# Patient Record
Sex: FEMALE | Race: WHITE | URBAN - METROPOLITAN AREA
[De-identification: names, ages, dates, MRNs, and addresses within clinical notes are randomized per-mention and may not be internally consistent; named-entity substitution may affect disease eponyms.]

---

## 2022-08-16 ENCOUNTER — OFFICE VISIT (OUTPATIENT)
Dept: URBAN - METROPOLITAN AREA CLINIC 105 | Facility: CLINIC | Age: 67
End: 2022-08-16
Payer: MEDICARE

## 2022-08-16 ENCOUNTER — WEB ENCOUNTER (OUTPATIENT)
Dept: URBAN - METROPOLITAN AREA CLINIC 105 | Facility: CLINIC | Age: 67
End: 2022-08-16

## 2022-08-16 ENCOUNTER — LAB OUTSIDE AN ENCOUNTER (OUTPATIENT)
Dept: URBAN - METROPOLITAN AREA CLINIC 105 | Facility: CLINIC | Age: 67
End: 2022-08-16

## 2022-08-16 VITALS
DIASTOLIC BLOOD PRESSURE: 54 MMHG | TEMPERATURE: 97.1 F | HEART RATE: 106 BPM | WEIGHT: 230 LBS | HEIGHT: 62 IN | BODY MASS INDEX: 42.33 KG/M2 | SYSTOLIC BLOOD PRESSURE: 117 MMHG

## 2022-08-16 DIAGNOSIS — R11.0 NAUSEA: ICD-10-CM

## 2022-08-16 DIAGNOSIS — D50.0 IRON DEFICIENCY ANEMIA DUE TO CHRONIC BLOOD LOSS: ICD-10-CM

## 2022-08-16 PROCEDURE — 99204 OFFICE O/P NEW MOD 45 MIN: CPT | Performed by: INTERNAL MEDICINE

## 2022-08-16 RX ORDER — PIOGLITAZONE 15 MG/1
1 TABLET TABLET ORAL ONCE A DAY
Qty: 30 | Status: ACTIVE | COMMUNITY
Start: 2022-08-16

## 2022-08-16 RX ORDER — METFORMIN ER 500 MG 500 MG/1
TAKE 2 TABLETS BY MOUTH EVERY MORNING AND 1 TABLET EVERY EVENING TABLET ORAL
Qty: 270 EACH | Refills: 3 | Status: ACTIVE | COMMUNITY

## 2022-08-16 RX ORDER — MULTIVITAMIN
1 TABLET TABLET ORAL ONCE A DAY
Qty: 30 | Status: ACTIVE | COMMUNITY
Start: 2022-08-16

## 2022-08-16 RX ORDER — LAMOTRIGINE 100 MG/1
1 TABLET TABLET ORAL ONCE A DAY
Qty: 30 | Status: ACTIVE | COMMUNITY
Start: 2022-08-16

## 2022-08-16 RX ORDER — LISINOPRIL 5 MG/1
1 TABLET TABLET ORAL ONCE A DAY
Qty: 30 | Status: ACTIVE | COMMUNITY
Start: 2022-08-16

## 2022-08-16 RX ORDER — INSULIN GLARGINE 100 [IU]/ML
INJECT 68 UNITS UNDER THE SKIN DAILY INJECTION, SOLUTION SUBCUTANEOUS
Qty: 15 MILLILITER | Refills: 8 | Status: ACTIVE | COMMUNITY

## 2022-08-16 RX ORDER — BLOOD SUGAR DIAGNOSTIC
1 STRIP BY FINGER STICK ROUTE 4 (FOUR) TIMES DAILY. ONE TOUCH ULTRA E11.9 STRIP MISCELLANEOUS
Qty: 100 EACH | Refills: 1 | Status: ACTIVE | COMMUNITY

## 2022-08-16 RX ORDER — NYSTATIN 100000 [USP'U]/G
CREAM TOPICAL
Qty: 30 GRAM | Status: ACTIVE | COMMUNITY

## 2022-08-16 RX ORDER — DABIGATRAN ETEXILATE MESYLATE 150 MG/1
TAKE 1 CAPSULE BY MOUTH TWICE A DAY CAPSULE ORAL
Qty: 180 EACH | Refills: 0 | Status: ACTIVE | COMMUNITY

## 2022-08-16 RX ORDER — ATORVASTATIN CALCIUM 40 MG/1
1 TABLET TABLET, FILM COATED ORAL ONCE A DAY
Qty: 30 | Status: ACTIVE | COMMUNITY
Start: 2022-08-16

## 2022-08-16 NOTE — HPI-TODAY'S VISIT:
The patient presents for iron deficiency anemia.  Today, the patient says she has been on oral iron 65 mg 1 pill QD (no associated constipation) for a month. She denies melena before starting on iron. She denies rectal bleeding. She had her last colon (first one) at age 62 which was normal she states. She has never had an EGD. She started taking ibuprofen after her surgery, taking 800 mg 1 pill QD until she d/c in July. Prior to her surgery, she was not taking ibuprofen. She follows with Dr. Lira since having a blood clot in 2018. She was previously taking Warfarin, but has been switched to Pradaxa. She last saw Dr. Lira in June. She reports having nausea/couple days that she takes Pepto-Bismol for.  Labs 6/11/22 - CBC normal except hgb 10.1

## 2022-08-17 LAB
ABSOLUTE BASOPHILS: 55
ABSOLUTE EOSINOPHILS: 164
ABSOLUTE LYMPHOCYTES: 1766
ABSOLUTE MONOCYTES: 556
ABSOLUTE NEUTROPHILS: 8360
BASOPHILS: 0.5
EOSINOPHILS: 1.5
FERRITIN, SERUM: 24
HEMATOCRIT: 32.9
HEMOGLOBIN: 10.8
IRON BIND.CAP.(TIBC): 381
IRON SATURATION: 15
IRON: 59
LYMPHOCYTES: 16.2
MCH: 29.2
MCHC: 32.8
MCV: 88.9
MONOCYTES: 5.1
MPV: 10.6
NEUTROPHILS: 76.7
PLATELET COUNT: 296
RDW: 13.2
RED BLOOD CELL COUNT: 3.7
WHITE BLOOD CELL COUNT: 10.9

## 2022-10-18 ENCOUNTER — TELEPHONE ENCOUNTER (OUTPATIENT)
Dept: URBAN - METROPOLITAN AREA CLINIC 98 | Facility: CLINIC | Age: 67
End: 2022-10-18

## 2022-10-21 ENCOUNTER — OFFICE VISIT (OUTPATIENT)
Dept: URBAN - METROPOLITAN AREA MEDICAL CENTER 33 | Facility: MEDICAL CENTER | Age: 67
End: 2022-10-21

## 2022-12-06 ENCOUNTER — TELEPHONE ENCOUNTER (OUTPATIENT)
Dept: URBAN - METROPOLITAN AREA CLINIC 92 | Facility: CLINIC | Age: 67
End: 2022-12-06

## 2022-12-06 ENCOUNTER — OFFICE VISIT (OUTPATIENT)
Dept: URBAN - METROPOLITAN AREA MEDICAL CENTER 33 | Facility: MEDICAL CENTER | Age: 67
End: 2022-12-06
Payer: MEDICARE

## 2022-12-06 DIAGNOSIS — D12.2 ADENOMA OF ASCENDING COLON: ICD-10-CM

## 2022-12-06 DIAGNOSIS — D50.9 ANEMIA: ICD-10-CM

## 2022-12-06 DIAGNOSIS — D12.8 ADENOMATOUS POLYP OF RECTUM: ICD-10-CM

## 2022-12-06 PROCEDURE — 45385 COLONOSCOPY W/LESION REMOVAL: CPT | Performed by: INTERNAL MEDICINE

## 2022-12-06 PROCEDURE — 45380 COLONOSCOPY AND BIOPSY: CPT | Performed by: INTERNAL MEDICINE

## 2022-12-06 PROCEDURE — 43239 EGD BIOPSY SINGLE/MULTIPLE: CPT | Performed by: INTERNAL MEDICINE

## 2022-12-06 RX ORDER — PIOGLITAZONE 15 MG/1
1 TABLET TABLET ORAL ONCE A DAY
Qty: 30 | Status: ACTIVE | COMMUNITY
Start: 2022-08-16

## 2022-12-06 RX ORDER — LISINOPRIL 5 MG/1
1 TABLET TABLET ORAL ONCE A DAY
Qty: 30 | Status: ACTIVE | COMMUNITY
Start: 2022-08-16

## 2022-12-06 RX ORDER — LAMOTRIGINE 100 MG/1
1 TABLET TABLET ORAL ONCE A DAY
Qty: 30 | Status: ACTIVE | COMMUNITY
Start: 2022-08-16

## 2022-12-06 RX ORDER — METFORMIN ER 500 MG 500 MG/1
TAKE 2 TABLETS BY MOUTH EVERY MORNING AND 1 TABLET EVERY EVENING TABLET ORAL
Qty: 270 EACH | Refills: 3 | Status: ACTIVE | COMMUNITY

## 2022-12-06 RX ORDER — DABIGATRAN ETEXILATE MESYLATE 150 MG/1
TAKE 1 CAPSULE BY MOUTH TWICE A DAY CAPSULE ORAL
Qty: 180 EACH | Refills: 0 | Status: ACTIVE | COMMUNITY

## 2022-12-06 RX ORDER — ATORVASTATIN CALCIUM 40 MG/1
1 TABLET TABLET, FILM COATED ORAL ONCE A DAY
Qty: 30 | Status: ACTIVE | COMMUNITY
Start: 2022-08-16

## 2022-12-06 RX ORDER — INSULIN GLARGINE 100 [IU]/ML
INJECT 68 UNITS UNDER THE SKIN DAILY INJECTION, SOLUTION SUBCUTANEOUS
Qty: 15 MILLILITER | Refills: 8 | Status: ACTIVE | COMMUNITY

## 2022-12-06 RX ORDER — NYSTATIN 100000 [USP'U]/G
CREAM TOPICAL
Qty: 30 GRAM | Status: ACTIVE | COMMUNITY

## 2022-12-06 RX ORDER — BLOOD SUGAR DIAGNOSTIC
1 STRIP BY FINGER STICK ROUTE 4 (FOUR) TIMES DAILY. ONE TOUCH ULTRA E11.9 STRIP MISCELLANEOUS
Qty: 100 EACH | Refills: 1 | Status: ACTIVE | COMMUNITY

## 2022-12-06 RX ORDER — MULTIVITAMIN
1 TABLET TABLET ORAL ONCE A DAY
Qty: 30 | Status: ACTIVE | COMMUNITY
Start: 2022-08-16

## 2022-12-06 RX ORDER — OMEPRAZOLE 40 MG/1
1 CAPSULE 30 MINUTES BEFORE MORNING MEAL CAPSULE, DELAYED RELEASE ORAL ONCE A DAY
Qty: 30 | Refills: 2 | OUTPATIENT
Start: 2022-12-06

## 2022-12-28 ENCOUNTER — ERX REFILL RESPONSE (OUTPATIENT)
Dept: URBAN - METROPOLITAN AREA CLINIC 105 | Facility: CLINIC | Age: 67
End: 2022-12-28

## 2022-12-28 RX ORDER — OMEPRAZOLE 40 MG/1
1 CAPSULE 30 MINUTES BEFORE MORNING MEAL CAPSULE, DELAYED RELEASE ORAL ONCE A DAY
Qty: 30 | Refills: 2 | OUTPATIENT

## 2022-12-28 RX ORDER — OMEPRAZOLE 40 MG/1
TAKE 1 CAPSULE BY MOUTH 30 MINUTES BEFORE MORNING MEAL EVERY DAY FOR 30 DAYS CAPSULE, DELAYED RELEASE ORAL
Qty: 30 CAPSULE | Refills: 2 | OUTPATIENT

## 2023-01-24 ENCOUNTER — OFFICE VISIT (OUTPATIENT)
Dept: URBAN - METROPOLITAN AREA CLINIC 105 | Facility: CLINIC | Age: 68
End: 2023-01-24
Payer: MEDICARE

## 2023-01-24 ENCOUNTER — LAB OUTSIDE AN ENCOUNTER (OUTPATIENT)
Dept: URBAN - METROPOLITAN AREA CLINIC 105 | Facility: CLINIC | Age: 68
End: 2023-01-24

## 2023-01-24 VITALS
WEIGHT: 233 LBS | BODY MASS INDEX: 42.88 KG/M2 | TEMPERATURE: 96.6 F | HEART RATE: 116 BPM | SYSTOLIC BLOOD PRESSURE: 142 MMHG | HEIGHT: 62 IN | DIASTOLIC BLOOD PRESSURE: 69 MMHG

## 2023-01-24 DIAGNOSIS — D50.0 IRON DEFICIENCY ANEMIA DUE TO CHRONIC BLOOD LOSS: ICD-10-CM

## 2023-01-24 DIAGNOSIS — K25.3 ACUTE GASTRIC EROSION: ICD-10-CM

## 2023-01-24 DIAGNOSIS — K21.00 GASTROESOPHAGEAL REFLUX DISEASE WITH ESOPHAGITIS WITHOUT HEMORRHAGE: ICD-10-CM

## 2023-01-24 DIAGNOSIS — Z86.010 PERSONAL HISTORY OF COLONIC POLYPS: ICD-10-CM

## 2023-01-24 DIAGNOSIS — R11.0 NAUSEA: ICD-10-CM

## 2023-01-24 PROCEDURE — 99214 OFFICE O/P EST MOD 30 MIN: CPT | Performed by: INTERNAL MEDICINE

## 2023-01-24 RX ORDER — INSULIN GLARGINE 100 [IU]/ML
INJECT 68 UNITS UNDER THE SKIN DAILY INJECTION, SOLUTION SUBCUTANEOUS
Qty: 15 MILLILITER | Refills: 8 | Status: ACTIVE | COMMUNITY

## 2023-01-24 RX ORDER — ATORVASTATIN CALCIUM 40 MG/1
1 TABLET TABLET, FILM COATED ORAL ONCE A DAY
Qty: 30 | Status: ACTIVE | COMMUNITY
Start: 2022-08-16

## 2023-01-24 RX ORDER — METFORMIN ER 500 MG 500 MG/1
TAKE 2 TABLETS BY MOUTH EVERY MORNING AND 1 TABLET EVERY EVENING TABLET ORAL
Qty: 270 EACH | Refills: 3 | Status: ACTIVE | COMMUNITY

## 2023-01-24 RX ORDER — ONDANSETRON 4 MG/1
1 TABLET ON THE TONGUE AND ALLOW TO DISSOLVE TABLET, ORALLY DISINTEGRATING ORAL
Qty: 30 | Refills: 2 | OUTPATIENT
Start: 2023-01-24

## 2023-01-24 RX ORDER — NYSTATIN 100000 [USP'U]/G
CREAM TOPICAL
Qty: 30 GRAM | Status: ACTIVE | COMMUNITY

## 2023-01-24 RX ORDER — LISINOPRIL 5 MG/1
1 TABLET TABLET ORAL ONCE A DAY
Qty: 30 | Status: ACTIVE | COMMUNITY
Start: 2022-08-16

## 2023-01-24 RX ORDER — DABIGATRAN ETEXILATE MESYLATE 150 MG/1
TAKE 1 CAPSULE BY MOUTH TWICE A DAY CAPSULE ORAL
Qty: 180 EACH | Refills: 0 | Status: ACTIVE | COMMUNITY

## 2023-01-24 RX ORDER — OMEPRAZOLE 40 MG/1
TAKE 1 CAPSULE BY MOUTH 30 MINUTES BEFORE MORNING MEAL EVERY DAY FOR 30 DAYS CAPSULE, DELAYED RELEASE ORAL
Qty: 30 CAPSULE | Refills: 2 | Status: ACTIVE | COMMUNITY

## 2023-01-24 RX ORDER — MULTIVITAMIN
1 TABLET TABLET ORAL ONCE A DAY
Qty: 30 | Status: ON HOLD | COMMUNITY
Start: 2022-08-16

## 2023-01-24 RX ORDER — BLOOD SUGAR DIAGNOSTIC
1 STRIP BY FINGER STICK ROUTE 4 (FOUR) TIMES DAILY. ONE TOUCH ULTRA E11.9 STRIP MISCELLANEOUS
Qty: 100 EACH | Refills: 1 | Status: ACTIVE | COMMUNITY

## 2023-01-24 RX ORDER — PIOGLITAZONE 15 MG/1
1 TABLET TABLET ORAL ONCE A DAY
Qty: 30 | Status: ON HOLD | COMMUNITY
Start: 2022-08-16

## 2023-01-24 RX ORDER — LAMOTRIGINE 100 MG/1
1 TABLET TABLET ORAL ONCE A DAY
Qty: 30 | Status: ACTIVE | COMMUNITY
Start: 2022-08-16

## 2023-01-24 RX ORDER — OMEPRAZOLE 20 MG/1
1 CAPSULE 30 MINUTES BEFORE MORNING MEAL CAPSULE, DELAYED RELEASE ORAL ONCE A DAY
Qty: 90 | Refills: 3 | OUTPATIENT

## 2023-01-24 NOTE — HPI-TODAY'S VISIT:
PAST MEDICAL HISTORY: On 8/16/22, the patient said she had been on oral iron 65 mg 1 pill QD (no associated constipation) for a month. She denied melena before starting on iron. She denied rectal bleeding. She had her last colon (first one) at age 62 which was normal she stated. She had never had an EGD. She started taking ibuprofen after her surgery, taking 800 mg 1 pill QD until she d/c in July. Prior to her surgery, she was not taking ibuprofen. She followed with Dr. Lira since having a blood clot in 2018. She was previously taking Warfarin, but had been switched to Pradaxa. She last saw Dr. Lira in June. She reported having nausea/couple days that she took Pepto-Bismol for.  HPI: Today, the patient follows up after having EGD/Colonoscopy 12/6/22.   Colonoscopy 12/6/22 - 10 mm and 3 mm serrated polyps removed; DD, IHs.  EGD 12/6/22 - 3 cm HH with LA grade A reflux esophagitis; small antral erosion with heme-tinged mucus.  Patient begun on omeprazole 40 mg daily.  Today, she states she is continuing on omeprazole 40 mg daily.  She is taking Pradaxa and iron supplement daily.  She notes having almost daily nausea.  Last emesis 1-2 weeks ago.    Labs 6/11/22 - CBC normal except hgb 10.1

## 2023-01-25 LAB
ABSOLUTE BASOPHILS: 52
ABSOLUTE EOSINOPHILS: 124
ABSOLUTE LYMPHOCYTES: 1741
ABSOLUTE MONOCYTES: 525
ABSOLUTE NEUTROPHILS: 7859
BASOPHILS: 0.5
EOSINOPHILS: 1.2
FERRITIN, SERUM: 41
HEMATOCRIT: 35.7
HEMOGLOBIN: 11.7
IRON BIND.CAP.(TIBC): 320
IRON SATURATION: 18
IRON: 56
LYMPHOCYTES: 16.9
MCH: 29.3
MCHC: 32.8
MCV: 89.5
MONOCYTES: 5.1
MPV: 10.7
NEUTROPHILS: 76.3
PLATELET COUNT: 280
RDW: 11.8
RED BLOOD CELL COUNT: 3.99
WHITE BLOOD CELL COUNT: 10.3

## 2023-01-29 PROBLEM — 428283002: Status: ACTIVE | Noted: 2023-01-24

## 2023-01-29 PROBLEM — 266433003: Status: ACTIVE | Noted: 2023-01-24

## 2023-01-29 PROBLEM — 724556004: Status: ACTIVE | Noted: 2022-08-16

## 2023-02-02 ENCOUNTER — ERX REFILL RESPONSE (OUTPATIENT)
Dept: URBAN - METROPOLITAN AREA CLINIC 105 | Facility: CLINIC | Age: 68
End: 2023-02-02

## 2023-02-02 RX ORDER — OMEPRAZOLE 40 MG/1
TAKE 1 CAPSULE BY MOUTH EVERY DAY 30 MINUTES BEFORE MORNING MEAL CAPSULE, DELAYED RELEASE ORAL
Qty: 90 CAPSULE | Refills: 1 | OUTPATIENT

## 2023-02-02 RX ORDER — OMEPRAZOLE 20 MG/1
1 CAPSULE 30 MINUTES BEFORE MORNING MEAL CAPSULE, DELAYED RELEASE ORAL ONCE A DAY
Qty: 90 | Refills: 3 | OUTPATIENT

## 2023-05-05 NOTE — PHYSICAL EXAM PSYCH:
normal mood with appropriate affect Minocycline Counseling: Patient advised regarding possible photosensitivity and discoloration of the teeth, skin, lips, tongue and gums.  Patient instructed to avoid sunlight, if possible.  When exposed to sunlight, patients should wear protective clothing, sunglasses, and sunscreen.  The patient was instructed to call the office immediately if the following severe adverse effects occur:  hearing changes, easy bruising/bleeding, severe headache, or vision changes.  The patient verbalized understanding of the proper use and possible adverse effects of minocycline.  All of the patient's questions and concerns were addressed.

## 2023-06-14 ENCOUNTER — OFFICE VISIT (OUTPATIENT)
Dept: URBAN - METROPOLITAN AREA CLINIC 105 | Facility: CLINIC | Age: 68
End: 2023-06-14

## 2023-07-25 ENCOUNTER — OFFICE VISIT (OUTPATIENT)
Dept: URBAN - METROPOLITAN AREA CLINIC 105 | Facility: CLINIC | Age: 68
End: 2023-07-25

## 2024-01-14 ENCOUNTER — ERX REFILL RESPONSE (OUTPATIENT)
Dept: URBAN - METROPOLITAN AREA CLINIC 105 | Facility: CLINIC | Age: 69
End: 2024-01-14

## 2024-01-14 RX ORDER — OMEPRAZOLE 40 MG/1
TAKE 1 CAPSULE BY MOUTH EVERY DAY 30 MINUTES BEFORE MORNING MEAL CAPSULE, DELAYED RELEASE ORAL
Qty: 90 CAPSULE | Refills: 1 | OUTPATIENT

## 2024-01-14 RX ORDER — OMEPRAZOLE 20 MG/1
TAKE 1 CAPSULE BY MOUTH EVERY DAY 30 MINUTES BEFORE MORNING MEAL FOR 90 DAYS CAPSULE, DELAYED RELEASE ORAL
Qty: 90 CAPSULE | Refills: 3 | OUTPATIENT

## 2024-05-21 ENCOUNTER — DASHBOARD ENCOUNTERS (OUTPATIENT)
Age: 69
End: 2024-05-21

## 2024-05-21 ENCOUNTER — OFFICE VISIT (OUTPATIENT)
Dept: URBAN - METROPOLITAN AREA CLINIC 105 | Facility: CLINIC | Age: 69
End: 2024-05-21
Payer: MEDICARE

## 2024-05-21 VITALS
SYSTOLIC BLOOD PRESSURE: 142 MMHG | HEIGHT: 62 IN | TEMPERATURE: 97.2 F | BODY MASS INDEX: 44.61 KG/M2 | WEIGHT: 242.4 LBS | DIASTOLIC BLOOD PRESSURE: 77 MMHG | HEART RATE: 116 BPM

## 2024-05-21 DIAGNOSIS — K21.00 GASTROESOPHAGEAL REFLUX DISEASE WITH ESOPHAGITIS WITHOUT HEMORRHAGE: ICD-10-CM

## 2024-05-21 DIAGNOSIS — R11.0 NAUSEA: ICD-10-CM

## 2024-05-21 DIAGNOSIS — K25.3 ACUTE GASTRIC EROSION: ICD-10-CM

## 2024-05-21 DIAGNOSIS — Z86.010 PERSONAL HISTORY OF COLONIC POLYPS: ICD-10-CM

## 2024-05-21 DIAGNOSIS — D50.0 IRON DEFICIENCY ANEMIA DUE TO CHRONIC BLOOD LOSS: ICD-10-CM

## 2024-05-21 PROCEDURE — 99213 OFFICE O/P EST LOW 20 MIN: CPT | Performed by: INTERNAL MEDICINE

## 2024-05-21 RX ORDER — MULTIVITAMIN
1 TABLET TABLET ORAL ONCE A DAY
Qty: 30 | Status: ACTIVE | COMMUNITY
Start: 2022-08-16

## 2024-05-21 RX ORDER — PIOGLITAZONE 15 MG/1
1 TABLET TABLET ORAL ONCE A DAY
Qty: 30 | Status: ACTIVE | COMMUNITY
Start: 2022-08-16

## 2024-05-21 RX ORDER — INSULIN GLARGINE 100 [IU]/ML
INJECT 68 UNITS UNDER THE SKIN DAILY INJECTION, SOLUTION SUBCUTANEOUS
Qty: 15 MILLILITER | Refills: 8 | Status: ACTIVE | COMMUNITY

## 2024-05-21 RX ORDER — LAMOTRIGINE 100 MG/1
1 TABLET TABLET ORAL ONCE A DAY
Qty: 30 | Status: ACTIVE | COMMUNITY
Start: 2022-08-16

## 2024-05-21 RX ORDER — METFORMIN ER 500 MG 500 MG/1
TAKE 2 TABLETS BY MOUTH EVERY MORNING AND 1 TABLET EVERY EVENING TABLET ORAL
Qty: 270 EACH | Refills: 3 | Status: ACTIVE | COMMUNITY

## 2024-05-21 RX ORDER — DABIGATRAN ETEXILATE MESYLATE 150 MG/1
TAKE 1 CAPSULE BY MOUTH TWICE A DAY CAPSULE ORAL
Qty: 180 EACH | Refills: 0 | Status: ACTIVE | COMMUNITY

## 2024-05-21 RX ORDER — ATORVASTATIN CALCIUM 40 MG/1
1 TABLET TABLET, FILM COATED ORAL ONCE A DAY
Qty: 30 | Status: ACTIVE | COMMUNITY
Start: 2022-08-16

## 2024-05-21 RX ORDER — LISINOPRIL 5 MG/1
1 TABLET TABLET ORAL ONCE A DAY
Qty: 30 | Status: ACTIVE | COMMUNITY
Start: 2022-08-16

## 2024-05-21 RX ORDER — ONDANSETRON 4 MG/1
1 TABLET ON THE TONGUE AND ALLOW TO DISSOLVE TABLET, ORALLY DISINTEGRATING ORAL
Qty: 30 | Refills: 2 | Status: ACTIVE | COMMUNITY
Start: 2023-01-24

## 2024-05-21 RX ORDER — NYSTATIN 100000 [USP'U]/G
CREAM TOPICAL
Qty: 30 GRAM | Status: ACTIVE | COMMUNITY

## 2024-05-21 RX ORDER — BLOOD SUGAR DIAGNOSTIC
1 STRIP BY FINGER STICK ROUTE 4 (FOUR) TIMES DAILY. ONE TOUCH ULTRA E11.9 STRIP MISCELLANEOUS
Qty: 100 EACH | Refills: 1 | Status: ACTIVE | COMMUNITY

## 2024-05-21 RX ORDER — OMEPRAZOLE 20 MG/1
TAKE 1 CAPSULE BY MOUTH EVERY DAY 30 MINUTES BEFORE MORNING MEAL FOR 90 DAYS CAPSULE, DELAYED RELEASE ORAL
Qty: 90 | Refills: 0 | Status: ACTIVE | COMMUNITY

## 2024-05-21 RX ORDER — OMEPRAZOLE 20 MG/1
1 CAPSULE 30 MINUTES BEFORE MORNING MEAL CAPSULE, DELAYED RELEASE ORAL ONCE A DAY
Qty: 90 | Refills: 3 | OUTPATIENT

## 2025-04-16 ENCOUNTER — TELEPHONE ENCOUNTER (OUTPATIENT)
Dept: URBAN - METROPOLITAN AREA CLINIC 105 | Facility: CLINIC | Age: 70
End: 2025-04-16

## 2025-04-16 RX ORDER — OMEPRAZOLE 20 MG/1
1 CAPSULE 30 MINUTES BEFORE MORNING MEAL CAPSULE, DELAYED RELEASE ORAL ONCE A DAY
Qty: 90 | Refills: 1 | OUTPATIENT